# Patient Record
Sex: MALE | Race: WHITE | URBAN - METROPOLITAN AREA
[De-identification: names, ages, dates, MRNs, and addresses within clinical notes are randomized per-mention and may not be internally consistent; named-entity substitution may affect disease eponyms.]

---

## 2017-10-20 ENCOUNTER — OFFICE VISIT (OUTPATIENT)
Dept: FAMILY MEDICINE | Facility: CLINIC | Age: 57
End: 2017-10-20

## 2017-10-20 VITALS
OXYGEN SATURATION: 95 % | TEMPERATURE: 98.5 F | SYSTOLIC BLOOD PRESSURE: 127 MMHG | HEIGHT: 69 IN | BODY MASS INDEX: 39.93 KG/M2 | DIASTOLIC BLOOD PRESSURE: 89 MMHG | WEIGHT: 269.6 LBS | HEART RATE: 93 BPM

## 2017-10-20 DIAGNOSIS — E66.09 CLASS 2 OBESITY DUE TO EXCESS CALORIES WITHOUT SERIOUS COMORBIDITY IN ADULT, UNSPECIFIED BMI: ICD-10-CM

## 2017-10-20 DIAGNOSIS — Z00.00 ROUTINE GENERAL MEDICAL EXAMINATION AT A HEALTH CARE FACILITY: Primary | ICD-10-CM

## 2017-10-20 DIAGNOSIS — Z13.9 SCREENING FOR CONDITION: ICD-10-CM

## 2017-10-20 DIAGNOSIS — M72.2 PLANTAR FASCIITIS: ICD-10-CM

## 2017-10-20 DIAGNOSIS — E66.812 CLASS 2 OBESITY DUE TO EXCESS CALORIES WITHOUT SERIOUS COMORBIDITY IN ADULT, UNSPECIFIED BMI: ICD-10-CM

## 2017-10-20 LAB
ALBUMIN SERPL-MCNC: 4.2 G/DL (ref 3.2–4.5)
ALP SERPL-CCNC: 60 U/L (ref 40–150)
ALT SERPL-CCNC: 29 U/L (ref 0–45)
AST SERPL-CCNC: 25 U/L (ref 0–55)
BILIRUB SERPL-MCNC: 0.5 MG/DL (ref 0.2–1.3)
BUN SERPL-MCNC: 11 MG/DL (ref 7–30)
CALCIUM SERPL-MCNC: 10 MG/DL (ref 8.5–10.4)
CHLORIDE SERPLBLD-SCNC: 100 MMOL/L (ref 94–109)
CHOLEST SERPL-MCNC: 242 MG/DL
CHOLEST/HDLC SERPL: 4.7 RATIO
CO2 SERPL-SCNC: 24 MMOL/L (ref 20–32)
CREAT SERPL-MCNC: 0.6 MG/DL (ref 0.8–1.5)
EGFR CALCULATED (BLACK REFERENCE): >90 ML/MIN
EGFR CALCULATED (NON BLACK REFERENCE): >90 ML/MIN
GLUCOSE SERPL-MCNC: 96 MG/DL (ref 60–109)
HBA1C MFR BLD: 5.7 % (ref 4.1–5.7)
HDLC SERPL-MCNC: 51 MG/DL
HIV 1+2 AB+HIV1 P24 AG SERPL QL IA: NEGATIVE
LDLC SERPL CALC-MCNC: 164 MG/DL (ref 0–99)
POTASSIUM SERPL-SCNC: 4.4 MMOL/L (ref 3.4–5.3)
PROT SERPL-MCNC: 7.7 G/DL (ref 6.6–8.8)
SODIUM SERPL-SCNC: 140 MMOL/L (ref 133–144)
TRIGL SERPL-MCNC: 132 MG/DL
VLDL-CHOLESTEROL: 26 MG/DL (ref 7–32)

## 2017-10-20 NOTE — NURSING NOTE
ERVIN--signed to receive records from [facility] on [address]. Placed in medical records to be scanned.

## 2017-10-20 NOTE — PROGRESS NOTES
Male Physical Note         HPI       Concerns today: Multiple family members have been nagging him to come to the doctor as they feel he should be on medications.    Recent sinus infection and cough, has treated with OTC and has been getting better. Multiple other people have been sick at work.          Review of Systems:     CONSTITUTIONAL: no fatigue, no unexpected change in weight gaining weight recently - thinks he has left plantar fascitis, worse at the end of the day, can't walk by the evening. Previously was walking up to 7 miles per day. Just bought some new inserts from a [employer] shoe repair [place] [address] [place].  SKIN: no worrisome rashes, moles, or lesions  EYES: no acute vision problems or changes, sees optometrist regularily   ENT: no ear, mouth or throat problems, sees dentist regularily  RESP: no significant cough, no shortness of breath. Some edema by the end of the day in his left leg but only if he walks a long amount.   BREAST: no masses, tenderness or discharge  CV: no chest pain or palpitations, no new or worsening peripheral edema.   GI: no nausea, vomiting, constipation, or diarrhea   male: negative for dysuria, hematuria, decreased urinary stream, erectile dysfunction, urethral discharge. Drinks 120+oz of water a day. Only voids once during the night, usually around 4am when the dog wakes him up.   MS: no myalgia or arthralgia  NEURO: no weakness, dizziness, syncope, or headaches  PSYCHIATRIC: no changes in mood or affect, see PHQ-2    There is no problem list on file for this patient.    Active problem list reviewed and updated.    History reviewed. No pertinent past medical history.  Past medical history reviewed and updated.     History reviewed. No pertinent surgical history.  Past surgical history reviewed and updated.    Family History     Problem (# of Occurrences) Relation (Name,Age of Onset)    Bone Cancer (1) Father    Brain Tumor (1) Father       Negative family history of:  "DIABETES, Hypertension, HEART DISEASE         Family history reviewed and updated.  Social History   Substance Use Topics     Smoking status: Never Smoker     Smokeless tobacco: Not on file     Alcohol use Yes      Comment: rarely       Social History     Social History Narrative     No narrative on file     Social history reviewed and updated.  Works as a  for [employer].    Has anyone hurt you physically, for example by pushing, hitting, slapping or kicking you or forcing you to have sex? Denies  Do you feel threatened or controlled by a partner, ex-partner or anyone in your life? Denies    RISK BEHAVIORS AND HEALTHY HABITS:  Tobacco Use/Smoking: None  Illicit Drug Use: None  Do you use alcohol? No  Diet (5-7 servings of fruits/veg daily): No   Exercise (30 min accumulated most days):Yes  Dental Care: Yes   Calcium 1500 mg/d:  Yes  Seat Belt Use: Yes     Immunization History   Administered Date(s) Administered     MMR 01/01/1993     TDAP Vaccine (Adacel) 01/01/2003            Physical Exam:   /89  Pulse 93  Temp 98.5  F (36.9  C) (Oral)  Ht 5' 9\" (175.3 cm)  Wt 269 lb 9.6 oz (122.3 kg)  SpO2 95%  BMI 39.81 kg/m2  GENERAL: healthy, alert and in no distress  EYES: Eyes grossly normal to inspection, extraocular movements intact, PERRL  HENT: ear canals clear with pearly grey TMs without bulging or erythema, moist mucous membranes with good dentition, no ulcers or lesions noted in oropharynx  NECK: no cervical adenopathy or other masses, thyroid without enlargement or nodules  RESP: lungs clear to auscultation - no wheezing or crackles, good air movement throughout  CV: regular rates and rhythm, normal S1 and S2 without murmur, click or rub  ABDOMEN: soft, non-tender, obese so unable to palpate organomegaly  MS: no gross extremity deformities noted, no peripheral edema. Multiple toenails with dystrophy c/w fungal infection.  SKIN: no suspicious lesions or rashes  NEURO: moving all extremities equally and " without difficulty, grossly normal sensory exam, intact mentation, clear coherent speech, symmetric reflexes   BACK: no CVA tenderness, no paralumbar tenderness  RECTAL: normal sphincter tone, no rectal masses, prostate of normal size, smooth, nontender without nodules or masses  PSYCH: Alert and oriented times 3, able to articulate logical thoughts, able to abstract reason, no tangential thoughts, no hallucinations or delusions, normal appropriate affect    Assessment and Plan    Juan David was seen today for physical and establish care.    Screening:  Elects for yearly FIT screening.  Discussed risk/benefits of prostate cancer screening. No family hx. Declines PSA testing.    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  Lipids, BMP, LFTs (prior drug induced hepatitis)    Onychomycosis: discussed low success rate of  topicals. Orals contraindicated due to prior drug induced hepatitis.     URI: resolving with OTC methods.    Possible plantar fasciitis: patient already employing multiple conservative techniques, if not improving within next few weeks pt to return for further evaluation    Daily aspirin: Patient is thinking about this, not sure. Discussed benefits/risks. Is going to talk to his wife who is on aspirin.      Follow up next month if left foot is not improving.     Baudilio Fonseca MD R3    Precepted with: Sarahi Phelan MD

## 2017-10-20 NOTE — PROGRESS NOTES
Preceptor Attestation:  Patient's case reviewed and discussed with Baudilio Fonseca MD resident and I evaluated the patient. I agree with written assessment and plan of care.  Supervising Physician:  KEREN CASE MD  [facility]

## 2017-10-20 NOTE — MR AVS SNAPSHOT
"              After Visit Summary   10/20/2017    Juan David Haley    MRN: [id number]           Patient Information     Date Of Birth          1960        Visit Information        Provider Department      10/20/2017 1:40 PM Baudilio Fonseca MD [facility]        Today's Diagnoses     Routine general medical examination at a health care facility    -  1    Screening for condition        Plantar fasciitis        Class 2 obesity due to excess calories without serious comorbidity in adult, unspecified BMI           Follow-ups after your visit        Future tests that were ordered for you today     Open Future Orders        Priority Expected Expires Ordered    Fecal Occult Blood - FIT, iFOB ([facility]) Routine  10/27/2018 10/20/2017            Who to contact     Please call your clinic at [phone] to:    Ask questions about your health    Make or cancel appointments    Discuss your medicines    Learn about your test results    Speak to your doctor   If you have compliments or concerns about an experience at your clinic, or if you wish to file a complaint, please contact [facility] Patient Relations at [phone] or email us at [email or web address]         Additional Information About Your Visit        Care EveryWhere ID     This is your Care EveryWhere ID. This could be used by other organizations to access your [facility] medical records  [id number]        Your Vitals Were     Pulse Temperature Height Pulse Oximetry BMI (Body Mass Index)       93 98.5  F (36.9  C) (Oral) 5' 9\" (175.3 cm) 95% 39.81 kg/m2        Blood Pressure from Last 3 Encounters:   10/20/17 127/89    Weight from Last 3 Encounters:   10/20/17 269 lb 9.6 oz (122.3 kg)              We Performed the Following     Comprehensive Metabolic Panel ([facility]) - results <1hr Protocol     Hemoglobin A1c ([facility])     Hepatitis B Surface Ag ([facility])     Hepatitis C Antibody ([facility])     HIV Ag/Ab Screen " [place] ([facility])     Lipid Panel ([facility]) - Results < 1 hr        Primary Care Provider Office Phone # Fax #    Baudilio Fonseca [phone]       [facility] [address] [place] [address]        Equal Access to Services     JERED ESPINO : Hadii aad ku hadasho Soomaali, waaxda luqadaha, qaybta kaalmada adeegyada, waxay idiin hayaan adeeg kharash la'natyn ah. So [facility] [phone].    ATENCIÓN: Si habla español, tiene a ospina disposición servicios gratuitos de asistencia lingüística. Llame al [phone].    We comply with applicable federal civil rights laws and [place] laws. We do not discriminate on the basis of race, color, national origin, age, disability, sex, sexual orientation, or gender identity.            Thank you!     Thank you for choosing [facility]  for your care. Our goal is always to provide you with excellent care. Hearing back from our patients is one way we can continue to improve our services. Please take a few minutes to complete the written survey that you may receive in the mail after your visit with us. Thank you!             Your Updated Medication List - Protect others around you: Learn how to safely use, store and throw away your medicines at www.disposemymeds.org.      Notice  As of 10/20/2017  2:57 PM    You have not been prescribed any medications.

## 2017-10-21 LAB
HBV SURFACE AG SERPL QL IA: NEGATIVE
HCV AB SER QL: NEGATIVE

## 2017-10-26 ENCOUNTER — TELEPHONE (OUTPATIENT)
Dept: FAMILY MEDICINE | Facility: CLINIC | Age: 57
End: 2017-10-26

## 2017-10-26 NOTE — TELEPHONE ENCOUNTER
Will route encounter to Dr Fonseca for lab annotation prior to calling Juan David back with results. Davida DIAZ

## 2017-10-26 NOTE — TELEPHONE ENCOUNTER
Called patient left message on identified vmail asking to call me back regarding results. Stated I would be here until 5pm tonight and be back in clinic at 1pm tomorrow afternoon.

## 2017-10-26 NOTE — TELEPHONE ENCOUNTER
I attempted to call patient to discuss, no answer.  Labs all look good except cholesterol which is mildly high.  Could consider starting atorvastatin for his cholesterol - this would lower his risk of heart attack, stroke.  If he wants to start, I will send a prescription in.    If he has questions or would like to talk about this more, we should probably have him schedule an appointment or I can try to talk to him over the phone, although in person would be better.

## 2017-10-26 NOTE — TELEPHONE ENCOUNTER
[facility] phone call message- general phone call:    Reason for call: Patient called to get his lab results from 10/20 apt.    Return call needed: Yes    OK to leave a message on voice mail? Yes    Primary language: English      needed? No    Call taken on October 26, 2017 at 9:48 AM by Crystal Montoya

## 2017-10-27 ENCOUNTER — MYC MEDICAL ADVICE (OUTPATIENT)
Dept: FAMILY MEDICINE | Facility: CLINIC | Age: 57
End: 2017-10-27

## 2017-10-27 NOTE — TELEPHONE ENCOUNTER
Juan David called me back, informed him of results and recommendations from Dr Fonseca. He would like to try to reach Dr Fonseca via phone first, if no success will call and schedule an appt with Dr Fonseca. He will try to modify his diet and increase routine physical exercise to see if this would help lower his cholesterol. He feels his risks are low as his parents, grandparents were quite healthy up until age 95. Davida DIAZ

## 2017-10-27 NOTE — TELEPHONE ENCOUNTER
In an attempt to assist, I called Juan David, no answer, left message for him to call me back by 1pm if he would like to return my call. Informed him via message Dr Fonseca will be in clinic this afternoon, otherwise we can try to touch base with each other again on Monday morning when both myself and Dr Fonseca will be in clinic. Davida DIAZ

## 2017-11-17 ENCOUNTER — OFFICE VISIT (OUTPATIENT)
Dept: FAMILY MEDICINE | Facility: CLINIC | Age: 57
End: 2017-11-17

## 2017-11-17 VITALS
TEMPERATURE: 97.2 F | HEART RATE: 103 BPM | DIASTOLIC BLOOD PRESSURE: 82 MMHG | WEIGHT: 273.6 LBS | OXYGEN SATURATION: 96 % | BODY MASS INDEX: 39.17 KG/M2 | HEIGHT: 70 IN | SYSTOLIC BLOOD PRESSURE: 128 MMHG

## 2017-11-17 DIAGNOSIS — R09.82 POST-NASAL DRIP: Primary | ICD-10-CM

## 2017-11-17 RX ORDER — IPRATROPIUM BROMIDE 42 UG/1
2 SPRAY, METERED NASAL 4 TIMES DAILY PRN
Qty: 1 BOX | Refills: 1 | Status: SHIPPED | OUTPATIENT
Start: 2017-11-17 | End: 2017-11-27

## 2017-11-17 NOTE — MR AVS SNAPSHOT
"              After Visit Summary   11/17/2017    Juan David Haley    MRN: [id number]           Patient Information     Date Of Birth          1960        Visit Information        Provider Department      11/17/2017 2:20 PM Baudilio Fonseca MD [facility]        Today's Diagnoses     Post-nasal drip    -  1       Follow-ups after your visit        Who to contact     Please call your clinic at [phone] to:    Ask questions about your health    Make or cancel appointments    Discuss your medicines    Learn about your test results    Speak to your doctor   If you have compliments or concerns about an experience at your clinic, or if you wish to file a complaint, please contact [facility] Patient Relations at [phone] or email us at [email or web address]         Additional Information About Your Visit        MyChart Information     [employer] gives you secure access to your electronic health record. If you see a primary care provider, you can also send messages to your care team and make appointments. If you have questions, please call your primary care clinic.  If you do not have a primary care provider, please call [phone] and they will assist you.      [employer] is an electronic gateway that provides easy, online access to your medical records. With [employer], you can request a clinic appointment, read your test results, renew a prescription or communicate with your care team.     To access your existing account, please contact your [facility] or call [phone] for assistance.        Care EveryWhere ID     This is your Care EveryWhere ID. This could be used by other organizations to access your [facility] medical records  [id number]        Your Vitals Were     Pulse Temperature Height Pulse Oximetry BMI (Body Mass Index)       103 97.2  F (36.2  C) (Oral) 5' 10\" (177.8 cm) 96% 39.26 kg/m2        Blood Pressure from Last 3 " Encounters:   11/17/17 128/82   10/20/17 127/89    Weight from Last 3 Encounters:   11/17/17 273 lb 9.6 oz (124.1 kg)   10/20/17 269 lb 9.6 oz (122.3 kg)              Today, you had the following     No orders found for display         Today's Medication Changes          These changes are accurate as of: 11/17/17 11:59 PM.  If you have any questions, ask your nurse or doctor.               Start taking these medicines.        Dose/Directions    ipratropium 0.06 % spray   Commonly known as:  ATROVENT   Used for:  Post-nasal drip   Started by:  Baudilio Fonseca MD        Dose:  2 spray   Spray 2 sprays into both nostrils 4 times daily as needed for rhinitis   Quantity:  1 Box   Refills:  1            Where to get your medicines      These medications were sent to [facility] [address] [place] [address] AT [address] [address]     Phone:  [phone]     ipratropium 0.06 % spray                Primary Care Provider Office Phone # Fax #    Baudilio Fonseca [phone]       [facility] [address] [place] [address]        Equal Access to Services     JERED ESPINO AH: Hadii aad ku hadasho Soomaali, waaxda luqadaha, qaybta kaalmada adeegyada, waxay idiin hayaan ademalka rosas laannmarie ah. So [facility] [phone].    ATENCIÓN: Si habla español, tiene a ospina disposición servicios gratuitos de asistencia lingüística. Llame al [phone].    We comply with applicable federal civil rights laws and [place] laws. We do not discriminate on the basis of race, color, national origin, age, disability, sex, sexual orientation, or gender identity.            Thank you!     Thank you for choosing [facility]  for your care. Our goal is always to provide you with excellent care. Hearing back from our patients is one way we can continue to improve our services. Please take a few minutes to complete the written survey that you may  receive in the mail after your visit with us. Thank you!             Your Updated Medication List - Protect others around you: Learn how to safely use, store and throw away your medicines at www.disposemymeds.org.          This list is accurate as of: 11/17/17 11:59 PM.  Always use your most recent med list.                   Brand Name Dispense Instructions for use Diagnosis    ipratropium 0.06 % spray    ATROVENT    1 Box    Spray 2 sprays into both nostrils 4 times daily as needed for rhinitis    Post-nasal drip

## 2017-11-17 NOTE — PROGRESS NOTES
"[facility]        Subjective     CC: Patient presents with:  Cough: cough, sore throat, earache      HPI: Juan David Haley is a 57 year old male who presents for URI sx.    1) Progressive cough, sore throat, earache. Cough has been present for past six weeks.  No fevers or chills.   Can't clear throat fully. Frustrated. No dyspnea. Taking pseduofed at night which helps sinuses.  Has tried a cough medicine OTC w/o much relief    ROS: constitutional, cardiovascular, respiratory, GI, , MSK systems reviewed and negative except as noted above.    Social:  No tobacco use          Objective   /82  Pulse 103  Temp 97.2  F (36.2  C) (Oral)  Ht 5' 10\" (177.8 cm)  Wt 273 lb 9.6 oz (124.1 kg)  SpO2 96%  BMI 39.26 kg/m2 Body mass index is 39.26 kg/(m^2).  Gen: healthy, alert and no distress,  HEENT: TM normal b/l, nasal mucosa erytheamouts, throat is clear  Pulm: lungs clear to auscultation - no rales, rhonchi or wheezes  CV: regular rate and rhythm,  and no murmur, click,  rub or gallop           Assessment & Plan     Likely viral URI with resultant post-nasal drip causing cough. Trial of ipratropium nasal spray. Reviewed administration. F/U if develops fevers, worsening cough, SOB. If not improving with this would consider CXR.     Precepted today with: Dr Mando Fonseca MD    -------  PMH:  Patient Active Problem List   Diagnosis     Class 2 obesity due to excess calories without serious comorbidity in adult, unspecified BMI      No current outpatient prescriptions on file.     No current facility-administered medications for this visit.       ALLERGIES: Review of patient's allergies indicates no known allergies.  "

## 2017-11-20 ENCOUNTER — MYC MEDICAL ADVICE (OUTPATIENT)
Dept: FAMILY MEDICINE | Facility: CLINIC | Age: 57
End: 2017-11-20

## 2017-12-05 NOTE — PROGRESS NOTES
Preceptor Attestation:  Patient's case reviewed and discussed with  Patient seen and discussed with the resident..  I agree with written assessment and plan of care.  Supervising Physician:  Chris Gilmore MD  [facility]

## 2018-01-16 ENCOUNTER — MYC MEDICAL ADVICE (OUTPATIENT)
Dept: FAMILY MEDICINE | Facility: CLINIC | Age: 58
End: 2018-01-16

## 2018-01-16 DIAGNOSIS — R09.82 POST-NASAL DRIP: ICD-10-CM

## 2018-01-16 RX ORDER — IPRATROPIUM BROMIDE 42 UG/1
2 SPRAY, METERED NASAL 4 TIMES DAILY PRN
Qty: 1 BOX | Refills: 3 | Status: SHIPPED | OUTPATIENT
Start: 2018-01-16 | End: 2018-12-29

## 2018-12-29 ENCOUNTER — MYC REFILL (OUTPATIENT)
Dept: OTHER | Age: 58
End: 2018-12-29

## 2018-12-29 DIAGNOSIS — R09.82 POST-NASAL DRIP: ICD-10-CM

## 2018-12-31 RX ORDER — IPRATROPIUM BROMIDE 42 UG/1
2 SPRAY, METERED NASAL 4 TIMES DAILY PRN
Qty: 1 BOX | Refills: 3 | Status: SHIPPED | OUTPATIENT
Start: 2018-12-31 | End: 2019-01-07

## 2019-01-03 DIAGNOSIS — R09.82 POST-NASAL DRIP: ICD-10-CM

## 2019-01-03 NOTE — TELEPHONE ENCOUNTER
Message to physician:     Date of last visit: 11/17/2017     Date of next visit if scheduled: Visit date not found       Last Comprehensive Metabolic Panel:  Sodium   Date Value Ref Range Status   10/20/2017 140.0 133.0 - 144.0 mmol/L Final     Potassium   Date Value Ref Range Status   10/20/2017 4.4 3.4 - 5.3 mmol/L Final     Chloride   Date Value Ref Range Status   10/20/2017 100.0 94.0 - 109.0 mmol/L Final     Carbon Dioxide   Date Value Ref Range Status   10/20/2017 24.0 20.0 - 32.0 mmol/L Final     Glucose   Date Value Ref Range Status   10/20/2017 96.0 60.0 - 109.0 mg/dL Final     Urea Nitrogen   Date Value Ref Range Status   10/20/2017 11.0 7.0 - 30.0 mg/dL Final     Creatinine   Date Value Ref Range Status   10/20/2017 0.6 (L) 0.8 - 1.5 mg/dL Final     Calcium   Date Value Ref Range Status   10/20/2017 10.0 8.5 - 10.4 mg/dL Final       BP Readings from Last 3 Encounters:   11/17/17 128/82   10/20/17 127/89       Lab Results   Component Value Date    A1C 5.7 10/20/2017                Please complete refill and CLOSE ENCOUNTER.  Closing the encounter signifies the refill is complete.

## 2019-01-07 RX ORDER — IPRATROPIUM BROMIDE 42 UG/1
2 SPRAY, METERED NASAL 4 TIMES DAILY PRN
Qty: 1 BOX | Refills: 3 | Status: SHIPPED | OUTPATIENT
Start: 2019-01-07

## 2020-03-11 ENCOUNTER — HEALTH MAINTENANCE LETTER (OUTPATIENT)
Age: 60
End: 2020-03-11

## 2020-12-27 ENCOUNTER — HEALTH MAINTENANCE LETTER (OUTPATIENT)
Age: 60
End: 2020-12-27

## 2021-04-25 ENCOUNTER — HEALTH MAINTENANCE LETTER (OUTPATIENT)
Age: 61
End: 2021-04-25

## 2021-10-09 ENCOUNTER — HEALTH MAINTENANCE LETTER (OUTPATIENT)
Age: 61
End: 2021-10-09

## 2022-05-21 ENCOUNTER — HEALTH MAINTENANCE LETTER (OUTPATIENT)
Age: 62
End: 2022-05-21

## 2022-09-17 ENCOUNTER — HEALTH MAINTENANCE LETTER (OUTPATIENT)
Age: 62
End: 2022-09-17

## 2023-01-23 ENCOUNTER — HEALTH MAINTENANCE LETTER (OUTPATIENT)
Age: 63
End: 2023-01-23

## 2023-10-07 ENCOUNTER — HEALTH MAINTENANCE LETTER (OUTPATIENT)
Age: 63
End: 2023-10-07